# Patient Record
Sex: FEMALE
[De-identification: names, ages, dates, MRNs, and addresses within clinical notes are randomized per-mention and may not be internally consistent; named-entity substitution may affect disease eponyms.]

---

## 2023-11-13 PROBLEM — Z00.00 ENCOUNTER FOR PREVENTIVE HEALTH EXAMINATION: Status: ACTIVE | Noted: 2023-11-13

## 2023-12-13 ENCOUNTER — APPOINTMENT (OUTPATIENT)
Dept: PEDIATRIC ALLERGY IMMUNOLOGY | Facility: CLINIC | Age: 58
End: 2023-12-13
Payer: COMMERCIAL

## 2023-12-13 VITALS
SYSTOLIC BLOOD PRESSURE: 123 MMHG | HEIGHT: 65.5 IN | HEART RATE: 72 BPM | WEIGHT: 135 LBS | BODY MASS INDEX: 22.22 KG/M2 | OXYGEN SATURATION: 98 % | TEMPERATURE: 97.3 F | DIASTOLIC BLOOD PRESSURE: 85 MMHG

## 2023-12-13 DIAGNOSIS — Z91.013 ALLERGY TO SEAFOOD: ICD-10-CM

## 2023-12-13 DIAGNOSIS — R10.9 UNSPECIFIED ABDOMINAL PAIN: ICD-10-CM

## 2023-12-13 DIAGNOSIS — Z91.018 ALLERGY TO OTHER FOODS: ICD-10-CM

## 2023-12-13 DIAGNOSIS — L50.8 OTHER URTICARIA: ICD-10-CM

## 2023-12-13 DIAGNOSIS — Z87.81 PERSONAL HISTORY OF (HEALED) TRAUMATIC FRACTURE: ICD-10-CM

## 2023-12-13 DIAGNOSIS — Z84.0 FAMILY HISTORY OF DISEASES OF THE SKIN AND SUBCUTANEOUS TISSUE: ICD-10-CM

## 2023-12-13 DIAGNOSIS — Z83.6 FAMILY HISTORY OF OTHER DISEASES OF THE RESPIRATORY SYSTEM: ICD-10-CM

## 2023-12-13 DIAGNOSIS — Z91.010 ALLERGY TO PEANUTS: ICD-10-CM

## 2023-12-13 DIAGNOSIS — Z87.19 PERSONAL HISTORY OF OTHER DISEASES OF THE DIGESTIVE SYSTEM: ICD-10-CM

## 2023-12-13 DIAGNOSIS — J30.2 OTHER SEASONAL ALLERGIC RHINITIS: ICD-10-CM

## 2023-12-13 PROCEDURE — 99204 OFFICE O/P NEW MOD 45 MIN: CPT

## 2023-12-13 RX ORDER — EPINEPHRINE 0.3 MG/.3ML
0.3 INJECTION INTRAMUSCULAR
Qty: 4 | Refills: 2 | Status: ACTIVE | COMMUNITY
Start: 2023-12-13 | End: 1900-01-01

## 2023-12-13 NOTE — ASSESSMENT
[FreeTextEntry1] : Chronic urticaria with dermatographia component: Blood work for urticaria workup.  Continue Zyrtec 5 mL, increase gradually up to 10 mL to control symptoms.  May also try Allegra.  Avoid overheating, excess tomatoes and berries which may cause direct histamine release. History of multiple food allergies (peanut, tree nuts, seafood, kiwi, pineapple, celery, certain seasonings): Blood work for food allergies.  Carry EpiPen (instructed in use).  Food allergy plan given and reviewed. Seasonal allergic rhinitis: Continue nasal saline and antihistamine as needed.  Not interested in environmental testing. History of abdominal pain and bloating: Blood work for celiac disease screening. Follow-up in 3 weeks.

## 2023-12-13 NOTE — SOCIAL HISTORY
[de-identified] : House with oil baseboard heating, wood fireplace, central air conditioning, no carpet, no pets, feather bedding.

## 2023-12-13 NOTE — REVIEW OF SYSTEMS
[Nasal Congestion] : nasal congestion [Post Nasal Drip] : post nasal drip [Difficulty Breathing] : no dyspnea [Nocturnal Awakening] : no nocturnal awakening with shortness of breath [Wheezing] : no wheezing [Heartburn] : heartburn [Abdominal Pain] : abdominal pain [Urticaria] : urticaria [Pruritus] : pruritus [Dry Skin] : ~L dry skin [Recurrent Sinus Infections] : no recurrent sinus infections [Recurrent Throat Infections] : no recurrence of throat infections [Recurrent Bronchitis] : no recurrent bronchitis [Recurrent Ear Infections] : no recurrence or ear infections [Recurrent Skin Infections] : no recurrent skin infections [Recurrent Pneumonia] : no ~T recurrent pneumonia

## 2023-12-13 NOTE — PHYSICAL EXAM
[Alert] : alert [No Acute Distress] : no acute distress [Normal Voice/Communication] : normal voice communication [Supple] : the neck was supple [Soft] : abdomen soft [Not Tender] : non-tender [Not Distended] : not distended [No HSM] : no hepato-splenomegaly [Normal Cervical Lymph Nodes] : cervical [Skin Intact] : skin intact  [No clubbing] : no clubbing [No Cyanosis] : no cyanosis [Alert, Awake, Oriented as Age-Appropriate] : alert, awake, oriented as age appropriate [de-identified] : Eyes clear. [de-identified] : Throat clear.  Nasal mucosa pink, mild stuffy nose bilateral, no discharge.  Tympanic membranes normal.  No sinus tenderness. [de-identified] : Chest clear, good air entry, no wheezing or crackles. [de-identified] : S1-S2 regular, no murmurs. [de-identified] : Urticaria on the right side of the abdomen.  Dermatographism.

## 2023-12-13 NOTE — HISTORY OF PRESENT ILLNESS
[de-identified] : In office to be evaluated for increased urticaria.  Returning patient to our office, old chart not available. Symptoms of chronic itch and dermatographism for many years, increased in the last 2 years and especially in the last 2 months.  Every night between 8 and 10 PM she gets very itchy and she gets urticaria, both linear after scratching and round.  Liquid Zyrtec 5 mL or Benadryl gel helpful.  Zyrtec causes fatigue.  She gets increased itching if she is relaxing.  She tried to change soap and detergent to hypoallergenic ones, with no improvement.  Uses Dove soap or Cetaphil and hypoallergenic detergent.  Has dry skin and moisturizers on and off.  Does not take hot showers.  Noticed dry skin and dry eyes after menopause.  She had COVID-19 in 2020, no prolonged antibiotic prior to symptoms started.  Had shingles prior to COVID-19 infection and she still gets pain on the right side of the back at times.  Diagnosed with chronic fatigue recently.  Gets herpes at times. Allergies: Chronic postnasal drip and increased cough in the fall when she also has increased itching.  She uses nasal saline. History of exercise-induced asthma as a teenager, but did not require an inhaler as adult, including with singing and acting. Food reactions: [place] and tuna caused increased itching.  Nuts caused itchy throat in childhood and peanuts caused itchy throat recently.  Kiwi and pineapple caused severe itching, kiwi also caused face swelling.  Celery because of urticaria and chest tightness.  Some seasonings cause itchy throat and itchy skin.  She had urticaria from lettuce.  She avoids all these foods and has an  EpiPen.  Would like to be retested, to introduce some of them in diet if possible. Medications: Penicillin and ampicillin caused widespread urticaria in childhood.  Aspirin caused increased cough.  She avoids all NSAIDs, tolerates Tylenol. Other medications: Estradiol, Pepcid as needed for reflux.

## 2024-01-29 ENCOUNTER — APPOINTMENT (OUTPATIENT)
Dept: PEDIATRIC ALLERGY IMMUNOLOGY | Facility: CLINIC | Age: 59
End: 2024-01-29